# Patient Record
Sex: FEMALE | Race: ASIAN | Employment: UNEMPLOYED | URBAN - METROPOLITAN AREA
[De-identification: names, ages, dates, MRNs, and addresses within clinical notes are randomized per-mention and may not be internally consistent; named-entity substitution may affect disease eponyms.]

---

## 2018-09-17 ENCOUNTER — TELEPHONE (OUTPATIENT)
Dept: FAMILY MEDICINE | Facility: CLINIC | Age: 18
End: 2018-09-17

## 2018-09-17 NOTE — TELEPHONE ENCOUNTER
[facility] phone call message- general phone call:    Reason for call: New pt wanting to schedule HFU from [facility] on 9/15/18 for Bladder. Notified patient she would have to speak with care coordinator before scheduling an appointment. Please call and advise.    Return call needed: Yes    OK to leave a message on voice mail? Yes    Primary language: Ophelia      needed? Yes    Call taken on September 17, 2018 at 4:44 PM by Corry Greenfield

## 2018-09-18 NOTE — TELEPHONE ENCOUNTER
Attempted to reach patient to help schedule ED follow up appointment. Patient was seen at [facility] ED on 9/14/18 for Ovarian Cyst/Leukocytosis. LM on  to call FD and schedule this with contact number given.

## 2018-09-24 ENCOUNTER — OFFICE VISIT (OUTPATIENT)
Dept: FAMILY MEDICINE | Facility: CLINIC | Age: 18
End: 2018-09-24
Payer: COMMERCIAL

## 2018-09-24 VITALS
HEART RATE: 80 BPM | SYSTOLIC BLOOD PRESSURE: 114 MMHG | OXYGEN SATURATION: 99 % | WEIGHT: 102.8 LBS | DIASTOLIC BLOOD PRESSURE: 79 MMHG | TEMPERATURE: 98.1 F | BODY MASS INDEX: 20.18 KG/M2 | HEIGHT: 60 IN

## 2018-09-24 DIAGNOSIS — N83.00 RUPTURE OF FOLLICULAR CYST OF OVARY: Primary | ICD-10-CM

## 2018-09-24 DIAGNOSIS — L70.0 ACNE VULGARIS: ICD-10-CM

## 2018-09-24 RX ORDER — CLINDAMYCIN PHOSPHATE 10 MG/G
GEL TOPICAL 2 TIMES DAILY
Qty: 60 G | Refills: 3 | Status: SHIPPED | OUTPATIENT
Start: 2018-09-24 | End: 2018-11-05

## 2018-09-24 NOTE — LETTER
RETURN TO WORK/SCHOOL FORM    9/24/2018    Re: Jocy Sandra  2000      To Whom It May Concern:     Jocy Sandra was seen in clinic today. She may return to school without restrictions on 9/25/18.          Restrictions:  None.      Helene Lam MD  9/24/2018 4:27 PM    none

## 2018-09-24 NOTE — PATIENT INSTRUCTIONS
Thank you for coming in to see us today.    For your abdominal pain, I am reassured that the pain has gone away. It is likely due to the release of an egg with your normal menstrual cycle. If the pain returns next month, it is ok to use tylenol or ibuprofen to help.    For your acne, I have sent a prescription for the clindamycin gel to the pharmacy.    Please return to our clinic in 1-3 months for a complete physical exam and acne follow up.

## 2018-09-24 NOTE — MR AVS SNAPSHOT
"              After Visit Summary   9/24/2018    Jocy Jocy    MRN: [id number]           Patient Information     Date Of Birth          2000        Visit Information        Provider Department      9/24/2018 3:20 PM Helene Lam MD [facility]        Today's Diagnoses     Acne vulgaris    -  1      Care Instructions    Thank you for coming in to see us today.    For your abdominal pain, I am reassured that the pain has gone away. It is likely due to the release of an egg with your normal menstrual cycle. If the pain returns next month, it is ok to use tylenol or ibuprofen to help.    For your acne, I have sent a prescription for the clindamycin gel to the pharmacy.    Please return to our clinic in 1-3 months for a complete physical exam and acne follow up.           Follow-ups after your visit        Who to contact     Please call your clinic at [phone] to:    Ask questions about your health    Make or cancel appointments    Discuss your medicines    Learn about your test results    Speak to your doctor            Additional Information About Your Visit        Care EveryWhere ID     This is your Care EveryWhere ID. This could be used by other organizations to access your [facility] medical records  [id number]        Your Vitals Were     Pulse Temperature Height Last Period Pulse Oximetry BMI (Body Mass Index)    80 98.1  F (36.7  C) (Oral) 4' 11.5\" (151.1 cm) 09/23/2018 99% 20.42 kg/m2       Blood Pressure from Last 3 Encounters:   09/24/18 114/79    Weight from Last 3 Encounters:   09/24/18 102 lb 12.8 oz (46.6 kg)              Today, you had the following     No orders found for display         Today's Medication Changes          These changes are accurate as of 9/24/18  4:23 PM.  If you have any questions, ask your nurse or doctor.               Start taking these medicines.        Dose/Directions    clindamycin 1 % topical gel   Commonly known as:  CLINDAMAX   Used for:  Acne vulgaris "   Started by:  Helene Lam MD        Apply topically 2 times daily   Quantity:  60 g   Refills:  3            Where to get your medicines      These medications were sent to [id number] - [place], [place] - [address] AT [facility]  [address], [place] [address]     Phone:  [phone]     clindamycin 1 % topical gel                Primary Care Provider Office Phone # Fax #    Helene Lam [phone] [address] [place] [address]        Equal Access to Services     [facility]: Hadii aad ku hadasho Soomaali, waaxda luqadaha, qaybta kaalmada adeegyada, waxay khalifin hayaan adejevon carrington . So [facility] [phone].    ATENCIÓN: Si habla español, tiene a nunez disposición servicios gratuitos de asistencia lingüística. Llame al [phone].    We comply with applicable federal civil rights laws and [place] laws. We do not discriminate on the basis of race, color, national origin, age, disability, sex, sexual orientation, or gender identity.            Thank you!     Thank you for choosing [facility]  for your care. Our goal is always to provide you with excellent care. Hearing back from our patients is one way we can continue to improve our services. Please take a few minutes to complete the written survey that you may receive in the mail after your visit with us. Thank you!             Your Updated Medication List - Protect others around you: Learn how to safely use, store and throw away your medicines at www.disposemymeds.org.          This list is accurate as of 9/24/18  4:23 PM.  Always use your most recent med list.                   Brand Name Dispense Instructions for use Diagnosis    clindamycin 1 % topical gel    CLINDAMAX    60 g    Apply topically 2 times daily    Acne vulgaris

## 2018-09-24 NOTE — NURSING NOTE
Due to patient being non-English speaking/uses sign language, an  was used for this visit. Only for face-to-face interpretation by an external agency, date and length of interpretation can be found on the scanned worksheet.     name: Dennis Duke  Agency: [employer]  Language: Ophelia   Telephone number: [phone]  Type of interpretation: Face-to-face, spoken

## 2018-09-24 NOTE — PROGRESS NOTES
Preceptor Attestation:   Patient seen, evaluated and discussed with the resident. I have verified the content of the note, which accurately reflects my assessment of the patient and the plan of care.  Supervising Physician:Mohinder Vasquez MD  [facility]

## 2018-09-24 NOTE — PROGRESS NOTES
HPI:       Jocy Jocy is a 18 year old  female without a significant past medical history who presents to establish care at our clinic and for:    1. Follow up ED visit 9/14/18 for ruptured ovarian cyst, leukocytosis: Seen at [facility] ED on 914/18 for RLQ abdominal pain. Transvaginal ultrasound done at the ED showed 2.9 x 2.4 x 2.0 cm complex hypoechoic focus with small amount of surrounding free fluid consistent with ruptured cyst or follicle. No evidence of ovarian torsion. Did have leukocytosis (WBC 13) . UPT negative at that time. Diagnosed with ruptured cyst and discharged home with instructions to use tylenol and ibuprofen for pain control.      Today, still having some subjective fevers (does not have a thermometer at home), some shaking chills, no sweating. No nausea, vomiting, or diarrhea. Denies dysuria, vaginal pain, vaginal discharge. Does get cramping pains with her period. Has had this sharp pain once or twice before, usually a few weeks before her period. Pain has not recurred. Some lightheadedness. One person in the household has a cough and runny nose for the past few days, however she denies URI symptoms.    First day of LMP was 9/23/18 (currently menstruating). Is not sexually active. Not on any contraceptives.     2. Acne: Her acne has gotten worse over the past 2 months. She saw the nurse at her school who gave her clindamycin 1% gel and another cream (unsure what it was). She feels like using them both for the past month has been helpful. Has a facewash (unsure what brand) that she uses sometimes. Stress and being tired makes her acne worse.     A [employer]  was used for this visit.         PMHX:     Encounter Diagnoses   Name Primary?     Rupture of follicular cyst of ovary Yes     Acne vulgaris      Current Outpatient Prescriptions   Medication Sig Dispense Refill     clindamycin (CLINDAMAX) 1 % topical gel Apply topically 2 times daily 60 g 3     Social History     Social  "History     Marital status: Single     Spouse name: N/A     Number of children: N/A     Years of education: N/A     Occupational History     Not on file.     Social History Main Topics     Smoking status: Never Smoker     Smokeless tobacco: Never Used     Alcohol use No     Drug use: No     Sexual activity: Not on file     Other Topics Concern     Not on file     Social History Narrative    Lives with her cousin's 's family. Started school this month for GED/vocational training. Has lived in [place] for 1 year, was previously in [place]. [place]oved [place] the  [place] from [place].       No Known Allergies         Review of Systems:   C: NEGATIVE for unexpected change in weight, POSITIVE for fatigue  I: NEGATIVE for worrisome rashes, moles or lesions  E/M: NEGATIVE for ear, mouth and throat problems  R: NEGATIVE for significant cough or shortness of breath  CV: NEGATIVE for chest pain, palpitations or new or worsening peripheral edema  GI: NEGATIVE for new onset or worsening nausea, vomiting or diarrhea  : NEGATIVE for frequency, dysuria, or hematuria  M: NEGATIVE for myalgias  N: NEGATIVE for syncope, headaches, POSITIVE for lightheadedness  E: NEGATIVE for temperature intolerance, skin/hair changes  H: NEGATIVE for easy bruising or bleeding problems  P: NEGATIVE for changes in mood or affect          Physical Exam:     /79  Pulse 80  Temp 98.1  F (36.7  C) (Oral)  Ht 4' 11.5\" (151.1 cm)  Wt 102 lb 12.8 oz (46.6 kg)  LMP 09/23/2018  SpO2 99%  BMI 20.42 kg/m2     GENERAL APPEARANCE: healthy, alert and no distress  EYES: Eyes grossly normal to inspection,  PERRL  HENT: Nose and mouth without ulcers or lesions  RESP: lungs clear to auscultation - no rales, rhonchi or wheezes  CV: regular rate and rhythm,  and no murmur, click,  rub or gallop  ABDOMEN: soft, nontender, without hepatosplenomegaly or masses  MS: extremities normal- no gross deformities noted  SKIN: Several pustules and " erythematous papules on her forehead and chin, no rashes  NEURO: Alert and oriented, CN II-XII grossly intact  PSYCH: mood and affect normal/bright    Assessment and Plan   Jocy Sandra is a 18 year old female presented to clinic for evaluation of:    1. Rupture of follicular cyst of ovary  The chronicity of the sharp RLQ pain occurring 10 days prior to the first day of her menses, the brevity of the pain episode, lack of tenderness on abdominal exam today are all consistent with ruptured follicular cyst. Not sexually active which leads away from ectopic pregnancy or TOA. Reviewed pelvic anatomy and the physiology of the menstrual cycle. She does report subjective fevers, fatigue, and slight lightheadedness which could be consistent with early URI vs aches and pains associated with menstrual bleeding. Plan to have her monitor her symptoms over the next 1-3 menstrual cycles to see if the fatigue and lightheadedness reoccur.     2. Acne vulgaris  The pustules and erythematous papules on her face are consistent with acne vulgaris. Given her good response to clindamycin that she received from her school nurse, will continue. Discussed the importance of using face wash, keeping hands away from face, washing pillow cases/towels, and using moisturizer in helping to treat acne.   - clindamycin (CLINDAMAX) 1 % topical gel; Apply topically 2 times daily  Dispense: 60 g; Refill: 3      Options for treatment and follow-up care were reviewed with the patient and/or guardian. Jocy Jocy and/or guardian engaged in the decision making process and verbalized understanding of the options discussed and agreed with the final plan.    Plan to return to clinic in 1-3 months for complete physical exam and follow up of her acne.     Helene Lam MD  [facility]  P: [phone]    Precepted today with: Mohinder Vasquez MD

## 2018-09-26 ENCOUNTER — HEALTH MAINTENANCE LETTER (OUTPATIENT)
Age: 18
End: 2018-09-26

## 2018-11-05 ENCOUNTER — OFFICE VISIT (OUTPATIENT)
Dept: FAMILY MEDICINE | Facility: CLINIC | Age: 18
End: 2018-11-05
Payer: COMMERCIAL

## 2018-11-05 VITALS
HEART RATE: 82 BPM | HEIGHT: 59 IN | RESPIRATION RATE: 18 BRPM | BODY MASS INDEX: 19.6 KG/M2 | SYSTOLIC BLOOD PRESSURE: 113 MMHG | OXYGEN SATURATION: 99 % | WEIGHT: 97.2 LBS | DIASTOLIC BLOOD PRESSURE: 76 MMHG | TEMPERATURE: 98 F

## 2018-11-05 DIAGNOSIS — L70.0 ACNE VULGARIS: Primary | ICD-10-CM

## 2018-11-05 RX ORDER — BENZOYL PEROXIDE 5 G/100G
GEL TOPICAL DAILY
Qty: 60 G | Refills: 1 | Status: SHIPPED | OUTPATIENT
Start: 2018-11-05 | End: 2019-06-07

## 2018-11-05 RX ORDER — CLINDAMYCIN PHOSPHATE 10 MG/G
GEL TOPICAL 2 TIMES DAILY
Qty: 60 G | Refills: 3 | Status: SHIPPED | OUTPATIENT
Start: 2018-11-05 | End: 2019-06-07

## 2018-11-05 NOTE — PROGRESS NOTES
SUBJECTIVE:                                                    Jocy Sandra is a 18 year old year old female who presents to clinic today for the following health issues:    Facial Acne:  Worse since this past summer. Was given two topical compounds by school nurse which helped quite a bit. Took photos of these medications and appears she was given benzoyl peroxide and clindamycin gel. She thinks these both helped and she needs refills. She has been off of the benzoyl peroxide since her last visit here in September. Believes it has gotten worse without the benzoyl peroxide. Would like refill. No other concerns today. Denies fever, chills, other skin rash, or medication side effects. Denies diarrhea. Regular periods, not on birth control currently.    Patient is an established patient of this clinic.    A [employer]  was used for this visit.  ----------------------------------------------------------------------------------------------------------------------  There is no problem list on file for this patient.    History reviewed. No pertinent surgical history.    Social History   Substance Use Topics     Smoking status: Never Smoker     Smokeless tobacco: Never Used     Alcohol use No     Family History   Problem Relation Age of Onset     Diabetes No family hx of      HEART DISEASE No family hx of      Hypertension No family hx of      Cancer No family hx of          Problem list and past medical, surgical, social, and family histories reviewed & adjusted, as indicated.    Current Outpatient Prescriptions   Medication Sig Dispense Refill     clindamycin (CLINDAMAX) 1 % topical gel Apply topically 2 times daily 60 g 3     Medication list reviewed and updated as indicated.    Allergies   Allergen Reactions     No Known Allergies      Allergies reviewed and updated as  "indicated.  ----------------------------------------------------------------------------------------------------------------------  ROS:  Constitutional, HEENT, cardiovascular, pulmonary, GI, skin, and psych systems are negative, except as otherwise noted.    OBJECTIVE:     /76  Pulse 82  Temp 98  F (36.7  C)  Resp 18  Ht 4' 11\" (149.9 cm)  Wt 97 lb 3.2 oz (44.1 kg)  SpO2 99%  BMI 19.63 kg/m2  Body mass index is 19.63 kg/(m^2).  General: Appears well and in no acute distress.  Cardiovascular: Regular rate and rhythm, normal S1 and S2 without murmur. No extra heartsounds or friction rub. Radial pulses present and equal bilaterally.  Respiratory: Lungs clear to auscultation bilaterally. No wheezing or crackles. No prolonged expiration. Symmetrical chest rise.  Musculoskeletal: No gross extremity deformities. No peripheral edema. Normal muscle bulk.  Derm: Bilateral facial comedone acne mainly over forehead, cheeks, and nasal bridge. A few pustules. No other suspicious lesions or rashes    Diagnostic Test Results:  none     ASSESSMENT/PLAN:     1. Acne vulgaris: Patient reports improvement previously with treatment of both benzoyl peroxide and clindamycin gel. Will refill. Should follow-up in ~2 months to look for benefit. If not improving consider other treatments.  - benzoyl peroxide 5 % topical gel; Apply topically daily  Dispense: 60 g; Refill: 1  - clindamycin (CLINDAMAX) 1 % topical gel; Apply topically 2 times daily  Dispense: 60 g; Refill: 3      Schedule follow-up appointment in 2 month(s).    There are no discontinued medications.    Constantine Gonzales MD  [facility] Resident  Pager# [phone]    Precepted with: Mohinder Vasquez MD    Options for treatment and follow-up care were reviewed with the patient and/or guardian. Ojcy Jocy and/or guardian engaged in the decision making process and verbalized understanding of the options discussed and agreed with the final plan    This " chart is completed utilizing dictation software; typos and/or incorrect word substitutions may unintentionally occur.     The Following is for Coding Purposes Only    Dx Type # This visit   Self-Limited                  (1 pt ea) 0   Established, Stable      (1 pt ea) 0   Established, Worse      (2 pt ea) 1   New, Simple                 (3 pt ea) 0   New, Further Work-Up (4 pt ea) 0       Total Points 2 - Low       Data Reviewed    Decision to Obtain Records                 (1 pt) 0   Review/Summarize Old Records         (2 pt) 0   Order/Review Labs                              (1 pt) 0   Order/Review Radiology                      (1 pt) 0   Order/Review Medical Tests                (1 pt) 0   Independent Review of EKG/XRay (2 pt ea) 0       Total Points 0 - Straightforward       Risk    1       One Minor Problem No   Basic Labs / Imaging / EKG No   Supportive Cares Yes   2       2+ Minor / Stable Chronic / Simple Acute Problem   No   Unstressed Test (Biopsy, PFT's, etc) No   OTC Meds / PT/OT Yes   3       Complicated Acute / Worse Chronic / 2+ Stable / Undiagnosed  Yes   Stress Test and Endoscopies No   Prescription Drugs or Treatment of Closed Injury Yes   4       Life Threatening Condition No   Rx With Monitoring / De-Escalate Care For Poor Prognosis  No   Level 3

## 2018-11-05 NOTE — MR AVS SNAPSHOT
"              After Visit Summary   11/5/2018    Jocy Jocy    MRN: [id number]           Patient Information     Date Of Birth          2000        Visit Information        Provider Department      11/5/2018 10:00 AM Constantine Gonzales MD [facility]        Today's Diagnoses     Acne vulgaris    -  1       Follow-ups after your visit        Follow-up notes from your care team     Return in about 2 months (around 1/5/2019).      Who to contact     Please call your clinic at [phone] to:    Ask questions about your health    Make or cancel appointments    Discuss your medicines    Learn about your test results    Speak to your doctor            Additional Information About Your Visit        Care EveryWhere ID     This is your Care EveryWhere ID. This could be used by other organizations to access your [facility] medical records  [id number]        Your Vitals Were     Pulse Temperature Respirations Height Pulse Oximetry BMI (Body Mass Index)    82 98  F (36.7  C) 18 4' 11\" (149.9 cm) 99% 19.63 kg/m2       Blood Pressure from Last 3 Encounters:   11/05/18 113/76   09/24/18 114/79    Weight from Last 3 Encounters:   11/05/18 97 lb 3.2 oz (44.1 kg)   09/24/18 102 lb 12.8 oz (46.6 kg)              We Performed the Following      : Sign Language or Oral - 38-52 minutes          Today's Medication Changes          These changes are accurate as of 11/5/18 11:59 PM.  If you have any questions, ask your nurse or doctor.               Start taking these medicines.        Dose/Directions    benzoyl peroxide 5 % topical gel   Used for:  Acne vulgaris   Started by:  Constantine Gonzales MD        Apply topically daily   Quantity:  60 g   Refills:  1            Where to get your medicines      These medications were sent to [id number] - [place], [place] - [address] AT [facility] & [address], [place] [address]     Phone:  [phone] "     benzoyl peroxide 5 % topical gel    clindamycin 1 % topical gel                Primary Care Provider Office Phone # Fax #    Helene WINCHESTER MD Carole [phone]       [facility] [address] [place] [address]        Equal Access to Services     [facility]: Hadii aad ku hadasho Soomaali, waaxda luqadaha, qaybta kaalmada adeegyada, waxay idiin haymeagann adejevon khkatiuska lajuan carlosbeau hardin. So [facility] [phone].    ATENCIÓN: Si habla español, tiene a nunez disposición servicios gratuitos de asistencia lingüística. Llame al [phone].    We comply with applicable federal civil rights laws and [place] laws. We do not discriminate on the basis of race, color, national origin, age, disability, sex, sexual orientation, or gender identity.            Thank you!     Thank you for choosing [facility]  for your care. Our goal is always to provide you with excellent care. Hearing back from our patients is one way we can continue to improve our services. Please take a few minutes to complete the written survey that you may receive in the mail after your visit with us. Thank you!             Your Updated Medication List - Protect others around you: Learn how to safely use, store and throw away your medicines at www.disposemymeds.org.          This list is accurate as of 11/5/18 11:59 PM.  Always use your most recent med list.                   Brand Name Dispense Instructions for use Diagnosis    benzoyl peroxide 5 % topical gel     60 g    Apply topically daily    Acne vulgaris       clindamycin 1 % topical gel    CLINDAMAX    60 g    Apply topically 2 times daily    Acne vulgaris

## 2018-11-05 NOTE — NURSING NOTE
Due to patient being non-English speaking/uses sign language, an  was used for this visit. Only for face-to-face interpretation by an external agency, date and length of interpretation can be found on the scanned worksheet.     name: Carlie CALIXTO  Agency: [employer]  Language: Ophelia   Telephone number: [phone]  Type of interpretation: Face-to-face, spoken

## 2019-06-07 ENCOUNTER — OFFICE VISIT (OUTPATIENT)
Dept: FAMILY MEDICINE | Facility: CLINIC | Age: 19
End: 2019-06-07
Payer: COMMERCIAL

## 2019-06-07 VITALS
RESPIRATION RATE: 16 BRPM | WEIGHT: 105 LBS | DIASTOLIC BLOOD PRESSURE: 66 MMHG | OXYGEN SATURATION: 97 % | BODY MASS INDEX: 20.62 KG/M2 | HEART RATE: 84 BPM | TEMPERATURE: 98.5 F | HEIGHT: 60 IN | SYSTOLIC BLOOD PRESSURE: 107 MMHG

## 2019-06-07 DIAGNOSIS — H65.90 FLUID COLLECTION OF MIDDLE EAR: Primary | ICD-10-CM

## 2019-06-07 ASSESSMENT — MIFFLIN-ST. JEOR: SCORE: 1157.16

## 2019-06-07 NOTE — PATIENT INSTRUCTIONS
Patient Education     Earache, No Infection (Adult)  Earaches can happen without an infection. This occurs when air and fluid build up behind the eardrum causing a feeling of fullness and discomfort and reduced hearing. This is called otitis media with effusion (OME) or serous otitis media. It means there is fluid in the middle ear. It is not the same as acute otitis media, which is typically from infection.  OME can happen when you have a cold if congestion blocks the passage that drains the middle ear. This passage is called the eustachian tube. OME may also occur with nasal allergies or after a bacterial middle ear infection.    The pain or discomfort may come and go. You may hear clicking or popping sounds when you chew or swallow. You may feel that your balance is off. Or you may hear ringing in the ear.  It often takes from several weeks up to 3 months for the fluid to clear on its own. Oral pain relievers and ear drops help if there is pain. Decongestants and antihistamines sometimes help. Antibiotics don't help since there is no infection. Your doctor may prescribe a nasal spray to help reduce swelling in the nose and eustachian tube. This can allow the ear to drain.  If your OME doesn't improve after 3 months, surgery may be used to drain the fluid and insert a small tube in the eardrum to allow continued drainage.  Because the middle ear fluid can become infected, it is important to watch for signs of an ear infection which may develop later. These signs include increased ear pain, fever, or drainage from the ear.  Home care  The following guidelines will help you care for yourself at home:    You may use over-the-counter medicine as directed to control pain, unless another medicine was prescribed. If you have chronic liver or kidney disease or ever had a stomach ulcer or GI bleeding, talk with your doctor before using these medicines. Aspirin should never be used in anyone under 18 years of age who is ill  with a fever. It may cause severe liver damage.    You may use over-the-counter decongestants such as phenylephrine or pseudoephedrine. But they are not always helpful. Don't use nasal spray decongestants more than 3 days. Longer use can make congestion worse. Prescription nasal sprays from your doctor don't typically have those restrictions.    Antihistamines may help if you are also having allergy symptoms.    You may use medicines such as guaifenesin to thin mucus and promote drainage.  Follow-up care  Follow up with your healthcare provider or as advised if you are not feeling better after 3 days.  When to seek medical advice  Call your healthcare provider right away if any of the following occur:    Your ear pain gets worse or does not start to improve     Fever of 100.4 F (38 C) or higher, or as directed by your healthcare provider    Fluid or blood draining from the ear    Headache or sinus pain    Stiff neck    Unusual drowsiness or confusion  Date Last Reviewed: 10/1/2016    [id number] [employer]. [address] [place] [address], [place] [address]. All rights reserved. This information is not intended as a substitute for professional medical care. Always follow your healthcare professional's instructions.

## 2019-06-07 NOTE — PROGRESS NOTES
Assessment and Plan   1. Fluid collection of middle ear  Trial of the below, ENT referral if not improving. No sign of TM rupture.  - fluticasone (VERAMYST) 27.5 MCG/SPRAY nasal spray; Spray 2 sprays into both nostrils daily  Dispense: 10 g; Refill: 1  -  : Sign Language or Oral - 38-52 minutes    Follow up if no improvement for ENT referral.    Options for treatment and follow-up care were reviewed with the patient and/or guardian. Jocy Sandra and/or guardian engaged in the decision making process and verbalized understanding of the options discussed and agreed with the final plan.    Shelton Parish MD  [facility], PGY-2    Precepted with: Franco Marin MD         HPI:   Jocy Sandra is a 19 year old female who presents to clinic today for   Chief Complaint   Patient presents with     Otalgia     Left ear noticed x2-3 days     Medication Reconciliation     Completd      Started to have pain in her left ear 1 week ago. Patient states that as a child she injured her L ear in that she was being fed and milk accidentally went into the ear canal and caused problems. Since then, she has had off and on pain the ear. She thinks she was about 1 month old. No fevers, chills, discharge from ear, loss of hearing. Sometimes lightheadedness and dizzyness, no syncope. Never has been seen by ENT.    A Jeremy Jacinto  was used for this visit.          Review of Systems:     A comprehensive 12 point review of systems was negative unless otherwise noted in the HPI.          PMHX:   Active Problems List  Patient Active Problem List   Diagnosis     Fluid collection of middle ear     Active problem list reviewed and updated.    Current Medications  Current Outpatient Medications   Medication Sig Dispense Refill     fluticasone (VERAMYST) 27.5 MCG/SPRAY nasal spray Spray 2 sprays into both nostrils daily 10 g 1     Medication list reviewed and  "updated.    Social History  Social History     Tobacco Use     Smoking status: Never Smoker     Smokeless tobacco: Never Used   Substance Use Topics     Alcohol use: No     Drug use: No     History   Drug Use No     Allergies  Allergies   Allergen Reactions     No Known Allergies      Allergies and Medication Intolerances Updated         Physical Exam:     Vitals:    06/07/19 1555   BP: 107/66   Pulse: 84   Resp: 16   Temp: 98.5  F (36.9  C)   TempSrc: Oral   SpO2: 97%   Weight: 47.6 kg (105 lb)   Height: 1.515 m (4' 11.65\")     Body mass index is 20.75 kg/m .    GENERAL APPEARANCE: alert, appears stated age, no acute distress  HEENT: Eyes grossly normal to inspection, nares normal, and mouth and throat without erythema, ulcers, or lesions, R ear canal and TM normal, L ear canal normal, L TM without bulge or blood, but with scarring stretching from 2:00 to 7:00  RESP: lungs clear to auscultation - no rales, rhonchi, or wheezes  CV: regular rate and rhythm, no murmur, click, rub, or gallop  ABDOMEN: soft, nontender   MSK: extremities normal, no gross deformities noted, no lower extremity edema  SKIN: no suspicious lesions or rashes   NEURO: Normal strength and tone, sensory exam grossly normal, mentation appears intact and speech normal  PSYCH: mood and affect normal/bright  "

## 2019-06-07 NOTE — NURSING NOTE
Due to patient being non-English speaking/uses sign language, an  was used for this visit. Only for face-to-face interpretation by an external agency, date and length of interpretation can be found on the scanned worksheet.     name: Marilu Guillory  Agency: [employer]  Language: Ophelia   Telephone number: [phone]  Type of interpretation: Face-to-face, spoken

## 2019-06-11 NOTE — PROGRESS NOTES
I have personally reviewed the history and examination as documented by Dr. Parish.  I was present during key portions of the visit and agree with the assessment and plan as documented for 19 yr old female here with serous otitis media. Tx given. Precautions/ anticipatory guidance given.     Franco Marin MD  June 11, 2019  2:15 PM